# Patient Record
Sex: FEMALE | Race: WHITE
[De-identification: names, ages, dates, MRNs, and addresses within clinical notes are randomized per-mention and may not be internally consistent; named-entity substitution may affect disease eponyms.]

---

## 2020-12-12 ENCOUNTER — HOSPITAL ENCOUNTER (INPATIENT)
Dept: HOSPITAL 95 - BC | Age: 25
LOS: 5 days | Discharge: HOME | End: 2020-12-17
Attending: OBSTETRICS & GYNECOLOGY | Admitting: ADVANCED PRACTICE MIDWIFE
Payer: COMMERCIAL

## 2020-12-12 VITALS — BODY MASS INDEX: 39.3 KG/M2 | WEIGHT: 216.27 LBS | HEIGHT: 62.01 IN

## 2020-12-12 DIAGNOSIS — D62: ICD-10-CM

## 2020-12-12 DIAGNOSIS — N17.0: ICD-10-CM

## 2020-12-12 DIAGNOSIS — O45.013: Primary | ICD-10-CM

## 2020-12-12 DIAGNOSIS — A41.9: ICD-10-CM

## 2020-12-12 DIAGNOSIS — F17.210: ICD-10-CM

## 2020-12-12 DIAGNOSIS — Z37.1: ICD-10-CM

## 2020-12-12 DIAGNOSIS — E87.5: ICD-10-CM

## 2020-12-12 DIAGNOSIS — E87.6: ICD-10-CM

## 2020-12-12 DIAGNOSIS — Z3A.28: ICD-10-CM

## 2020-12-12 DIAGNOSIS — Z20.828: ICD-10-CM

## 2020-12-12 DIAGNOSIS — R65.21: ICD-10-CM

## 2020-12-12 LAB
ALBUMIN SERPL BCP-MCNC: 2.8 G/DL (ref 3.4–5)
ALBUMIN/GLOB SERPL: 0.6 {RATIO} (ref 0.8–1.8)
ALT SERPL W P-5'-P-CCNC: 45 U/L (ref 12–78)
ANION GAP SERPL CALCULATED.4IONS-SCNC: 11 MMOL/L (ref 6–16)
AST SERPL W P-5'-P-CCNC: 46 U/L (ref 12–37)
BASOPHILS # BLD AUTO: 0.14 K/MM3 (ref 0–0.23)
BASOPHILS NFR BLD AUTO: 1 % (ref 0–2)
BILIRUB SERPL-MCNC: 0.9 MG/DL (ref 0.1–1)
BUN SERPL-MCNC: 17 MG/DL (ref 8–24)
CALCIUM SERPL-MCNC: 9.1 MG/DL (ref 8.5–10.1)
CHLORIDE SERPL-SCNC: 108 MMOL/L (ref 98–108)
CO2 SERPL-SCNC: 18 MMOL/L (ref 21–32)
CREAT SERPL-MCNC: 0.92 MG/DL (ref 0.4–1)
D DIMER PPP FEU-MCNC: >35.2 MG/L FEU (ref 0–0.52)
DEPRECATED RDW RBC AUTO: 42.6 FL (ref 35.1–46.3)
EOSINOPHIL # BLD AUTO: 0.16 K/MM3 (ref 0–0.68)
EOSINOPHIL NFR BLD AUTO: 1 % (ref 0–6)
ERYTHROCYTE [DISTWIDTH] IN BLOOD BY AUTOMATED COUNT: 13.9 % (ref 11.7–14.2)
FIBRINOGEN PPP-MCNC: 71 MG/DL (ref 170–430)
GLOBULIN SER CALC-MCNC: 4.5 G/DL (ref 2.2–4)
GLUCOSE SERPL-MCNC: 103 MG/DL (ref 70–99)
HCT VFR BLD AUTO: 35.8 % (ref 33–51)
HGB BLD-MCNC: 11.9 G/DL (ref 11.5–16)
IMM GRANULOCYTES # BLD AUTO: 0.34 K/MM3 (ref 0–0.1)
IMM GRANULOCYTES NFR BLD AUTO: 1 % (ref 0–1)
LYMPHOCYTES # BLD AUTO: 2.79 K/MM3 (ref 0.84–5.2)
LYMPHOCYTES NFR BLD AUTO: 9 % (ref 21–46)
MCHC RBC AUTO-ENTMCNC: 33.2 G/DL (ref 31.5–36.5)
MCV RBC AUTO: 85 FL (ref 80–100)
MONOCYTES # BLD AUTO: 1.3 K/MM3 (ref 0.16–1.47)
MONOCYTES NFR BLD AUTO: 4 % (ref 4–13)
NEUTROPHILS # BLD AUTO: 25.67 K/MM3 (ref 1.96–9.15)
NEUTROPHILS NFR BLD AUTO: 84 % (ref 41–73)
NRBC # BLD AUTO: 0 K/MM3 (ref 0–0.02)
NRBC BLD AUTO-RTO: 0 /100 WBC (ref 0–0.2)
PLATELET # BLD AUTO: 189 K/MM3 (ref 150–400)
POTASSIUM SERPL-SCNC: 3.3 MMOL/L (ref 3.5–5.5)
PROT SERPL-MCNC: 7.3 G/DL (ref 6.4–8.2)
PROTHROMBIN TIME: 14.8 SEC (ref 9.7–11.5)
SODIUM SERPL-SCNC: 137 MMOL/L (ref 136–145)

## 2020-12-12 PROCEDURE — G0480 DRUG TEST DEF 1-7 CLASSES: HCPCS

## 2020-12-12 PROCEDURE — P9012 CRYOPRECIPITATE EACH UNIT: HCPCS

## 2020-12-12 PROCEDURE — P9016 RBC LEUKOCYTES REDUCED: HCPCS

## 2020-12-12 PROCEDURE — A9270 NON-COVERED ITEM OR SERVICE: HCPCS

## 2020-12-12 NOTE — NUR
PT REQUESTING PERCOCET FOR PAIN. SHE IS OFFERED FENTANYL AFTER IV START, BUT
SHE STATES SHE IS TOO PAINFUL TO WAIT. CESAR MOORE CNM UPDATING ORDER FOR
PERCOCET Q 2 HOURS.

## 2020-12-13 LAB
ALBUMIN SERPL BCP-MCNC: 1.9 G/DL (ref 3.4–5)
ALBUMIN SERPL BCP-MCNC: 1.9 G/DL (ref 3.4–5)
ALBUMIN SERPL BCP-MCNC: 2.4 G/DL (ref 3.4–5)
ALBUMIN SERPL BCP-MCNC: 2.6 G/DL (ref 3.4–5)
ALBUMIN/GLOB SERPL: 0.6 {RATIO} (ref 0.8–1.8)
ALBUMIN/GLOB SERPL: 0.7 {RATIO} (ref 0.8–1.8)
ALT SERPL W P-5'-P-CCNC: 27 U/L (ref 12–78)
ALT SERPL W P-5'-P-CCNC: 31 U/L (ref 12–78)
ALT SERPL W P-5'-P-CCNC: 41 U/L (ref 12–78)
ALT SERPL W P-5'-P-CCNC: 44 U/L (ref 12–78)
ANION GAP SERPL CALCULATED.4IONS-SCNC: 6 MMOL/L (ref 6–16)
ANION GAP SERPL CALCULATED.4IONS-SCNC: 7 MMOL/L (ref 6–16)
ANION GAP SERPL CALCULATED.4IONS-SCNC: 7 MMOL/L (ref 6–16)
ANION GAP SERPL CALCULATED.4IONS-SCNC: 9 MMOL/L (ref 6–16)
AST SERPL W P-5'-P-CCNC: 29 U/L (ref 12–37)
AST SERPL W P-5'-P-CCNC: 31 U/L (ref 12–37)
AST SERPL W P-5'-P-CCNC: 37 U/L (ref 12–37)
AST SERPL W P-5'-P-CCNC: 44 U/L (ref 12–37)
BASOPHILS # BLD AUTO: 0.03 K/MM3 (ref 0–0.23)
BASOPHILS # BLD AUTO: 0.05 K/MM3 (ref 0–0.23)
BASOPHILS # BLD AUTO: 0.06 K/MM3 (ref 0–0.23)
BASOPHILS NFR BLD AUTO: 0 % (ref 0–2)
BILIRUB SERPL-MCNC: 0.3 MG/DL (ref 0.1–1)
BILIRUB SERPL-MCNC: 0.4 MG/DL (ref 0.1–1)
BILIRUB SERPL-MCNC: 0.5 MG/DL (ref 0.1–1)
BILIRUB SERPL-MCNC: 0.5 MG/DL (ref 0.1–1)
BUN SERPL-MCNC: 23 MG/DL (ref 8–24)
BUN SERPL-MCNC: 24 MG/DL (ref 8–24)
BUN SERPL-MCNC: 24 MG/DL (ref 8–24)
BUN SERPL-MCNC: 26 MG/DL (ref 8–24)
CALCIUM SERPL-MCNC: 7.2 MG/DL (ref 8.5–10.1)
CALCIUM SERPL-MCNC: 7.7 MG/DL (ref 8.5–10.1)
CALCIUM SERPL-MCNC: 7.7 MG/DL (ref 8.5–10.1)
CALCIUM SERPL-MCNC: 8.1 MG/DL (ref 8.5–10.1)
CHLORIDE SERPL-SCNC: 108 MMOL/L (ref 98–108)
CHLORIDE SERPL-SCNC: 109 MMOL/L (ref 98–108)
CO2 SERPL-SCNC: 18 MMOL/L (ref 21–32)
CO2 SERPL-SCNC: 19 MMOL/L (ref 21–32)
CO2 SERPL-SCNC: 21 MMOL/L (ref 21–32)
CO2 SERPL-SCNC: 21 MMOL/L (ref 21–32)
CREAT SERPL-MCNC: 1.25 MG/DL (ref 0.4–1)
CREAT SERPL-MCNC: 1.3 MG/DL (ref 0.4–1)
CREAT SERPL-MCNC: 1.33 MG/DL (ref 0.4–1)
CREAT SERPL-MCNC: 1.58 MG/DL (ref 0.4–1)
D DIMER PPP FEU-MCNC: 12.98 MG/L FEU (ref 0–0.52)
D DIMER PPP FEU-MCNC: >35.2 MG/L FEU (ref 0–0.52)
D DIMER PPP FEU-MCNC: >35.2 MG/L FEU (ref 0–0.52)
DEPRECATED RDW RBC AUTO: 43.8 FL (ref 35.1–46.3)
DEPRECATED RDW RBC AUTO: 45.1 FL (ref 35.1–46.3)
DEPRECATED RDW RBC AUTO: 49.1 FL (ref 35.1–46.3)
DEPRECATED RDW RBC AUTO: 52 FL (ref 35.1–46.3)
EOSINOPHIL # BLD AUTO: 0 K/MM3 (ref 0–0.68)
EOSINOPHIL NFR BLD AUTO: 0 % (ref 0–6)
ERYTHROCYTE [DISTWIDTH] IN BLOOD BY AUTOMATED COUNT: 14.2 % (ref 11.7–14.2)
ERYTHROCYTE [DISTWIDTH] IN BLOOD BY AUTOMATED COUNT: 14.3 % (ref 11.7–14.2)
ERYTHROCYTE [DISTWIDTH] IN BLOOD BY AUTOMATED COUNT: 15.9 % (ref 11.7–14.2)
ERYTHROCYTE [DISTWIDTH] IN BLOOD BY AUTOMATED COUNT: 17 % (ref 11.7–14.2)
FIBRINOGEN PPP-MCNC: 239 MG/DL (ref 170–430)
FIBRINOGEN PPP-MCNC: 280 MG/DL (ref 170–430)
GLOBULIN SER CALC-MCNC: 2.9 G/DL (ref 2.2–4)
GLOBULIN SER CALC-MCNC: 3.1 G/DL (ref 2.2–4)
GLOBULIN SER CALC-MCNC: 4.2 G/DL (ref 2.2–4)
GLOBULIN SER CALC-MCNC: 4.3 G/DL (ref 2.2–4)
GLUCOSE SERPL-MCNC: 109 MG/DL (ref 70–99)
GLUCOSE SERPL-MCNC: 119 MG/DL (ref 70–99)
GLUCOSE SERPL-MCNC: 150 MG/DL (ref 70–99)
GLUCOSE SERPL-MCNC: 159 MG/DL (ref 70–99)
GLUCOSE UR-MCNC: (no result) MG/DL
HCT VFR BLD AUTO: 18.3 % (ref 33–51)
HCT VFR BLD AUTO: 20.7 % (ref 33–51)
HCT VFR BLD AUTO: 24.2 % (ref 33–51)
HCT VFR BLD AUTO: 29.2 % (ref 33–51)
HGB BLD-MCNC: 6 G/DL (ref 11.5–16)
HGB BLD-MCNC: 7.1 G/DL (ref 11.5–16)
HGB BLD-MCNC: 8.3 G/DL (ref 11.5–16)
HGB BLD-MCNC: 9.7 G/DL (ref 11.5–16)
IMM GRANULOCYTES # BLD AUTO: 0.23 K/MM3 (ref 0–0.1)
IMM GRANULOCYTES # BLD AUTO: 0.24 K/MM3 (ref 0–0.1)
IMM GRANULOCYTES # BLD AUTO: 0.31 K/MM3 (ref 0–0.1)
IMM GRANULOCYTES NFR BLD AUTO: 1 % (ref 0–1)
LYMPHOCYTES # BLD AUTO: 1.39 K/MM3 (ref 0.84–5.2)
LYMPHOCYTES # BLD AUTO: 1.73 K/MM3 (ref 0.84–5.2)
LYMPHOCYTES # BLD AUTO: 1.91 K/MM3 (ref 0.84–5.2)
LYMPHOCYTES NFR BLD AUTO: 5 % (ref 21–46)
LYMPHOCYTES NFR BLD AUTO: 6 % (ref 21–46)
LYMPHOCYTES NFR BLD AUTO: 7 % (ref 21–46)
MAGNESIUM SERPL-MCNC: 1.5 MG/DL (ref 1.6–2.4)
MCHC RBC AUTO-ENTMCNC: 32.8 G/DL (ref 31.5–36.5)
MCHC RBC AUTO-ENTMCNC: 33.2 G/DL (ref 31.5–36.5)
MCHC RBC AUTO-ENTMCNC: 34.3 G/DL (ref 31.5–36.5)
MCHC RBC AUTO-ENTMCNC: 34.3 G/DL (ref 31.5–36.5)
MCV RBC AUTO: 85 FL (ref 80–100)
MCV RBC AUTO: 85 FL (ref 80–100)
MCV RBC AUTO: 87 FL (ref 80–100)
MCV RBC AUTO: 87 FL (ref 80–100)
MONOCYTES # BLD AUTO: 0.54 K/MM3 (ref 0.16–1.47)
MONOCYTES # BLD AUTO: 0.57 K/MM3 (ref 0.16–1.47)
MONOCYTES # BLD AUTO: 1.04 K/MM3 (ref 0.16–1.47)
MONOCYTES NFR BLD AUTO: 2 % (ref 4–13)
MONOCYTES NFR BLD AUTO: 2 % (ref 4–13)
MONOCYTES NFR BLD AUTO: 4 % (ref 4–13)
NEUTROPHILS # BLD AUTO: 24.28 K/MM3 (ref 1.96–9.15)
NEUTROPHILS # BLD AUTO: 25.22 K/MM3 (ref 1.96–9.15)
NEUTROPHILS # BLD AUTO: 28.5 K/MM3 (ref 1.96–9.15)
NEUTROPHILS NFR BLD AUTO: 88 % (ref 41–73)
NEUTROPHILS NFR BLD AUTO: 91 % (ref 41–73)
NEUTROPHILS NFR BLD AUTO: 93 % (ref 41–73)
NRBC # BLD AUTO: 0 K/MM3 (ref 0–0.02)
NRBC # BLD AUTO: 0.02 K/MM3 (ref 0–0.02)
NRBC BLD AUTO-RTO: 0 /100 WBC (ref 0–0.2)
NRBC BLD AUTO-RTO: 0.1 /100 WBC (ref 0–0.2)
OXYCODONE UR-MCNC: DETECTED NG/ML
PLATELET # BLD AUTO: 107 K/MM3 (ref 150–400)
PLATELET # BLD AUTO: 146 K/MM3 (ref 150–400)
PLATELET # BLD AUTO: 95 K/MM3 (ref 150–400)
PLATELET # BLD AUTO: 95 K/MM3 (ref 150–400)
POTASSIUM SERPL-SCNC: 5 MMOL/L (ref 3.5–5.5)
POTASSIUM SERPL-SCNC: 5.6 MMOL/L (ref 3.5–5.5)
POTASSIUM SERPL-SCNC: 6.6 MMOL/L (ref 3.5–5.5)
POTASSIUM SERPL-SCNC: 6.9 MMOL/L (ref 3.5–5.5)
PROT SERPL-MCNC: 4.8 G/DL (ref 6.4–8.2)
PROT SERPL-MCNC: 5 G/DL (ref 6.4–8.2)
PROT SERPL-MCNC: 6.6 G/DL (ref 6.4–8.2)
PROT SERPL-MCNC: 6.9 G/DL (ref 6.4–8.2)
PROT UR STRIP-MCNC: (no result) MG/DL
PROTHROMBIN TIME: 10.2 SEC (ref 9.7–11.5)
PROTHROMBIN TIME: 10.7 SEC (ref 9.7–11.5)
PROTHROMBIN TIME: 11.9 SEC (ref 9.7–11.5)
SODIUM SERPL-SCNC: 134 MMOL/L (ref 136–145)
SODIUM SERPL-SCNC: 135 MMOL/L (ref 136–145)
SODIUM SERPL-SCNC: 136 MMOL/L (ref 136–145)
SODIUM SERPL-SCNC: 136 MMOL/L (ref 136–145)
SP GR SPEC: 1.02 (ref 1–1.02)
UROBILINOGEN UR STRIP-MCNC: (no result) MG/DL
WBC #/AREA URNS HPF: (no result) /HPF (ref 0–5)

## 2020-12-13 PROCEDURE — 30233N1 TRANSFUSION OF NONAUTOLOGOUS RED BLOOD CELLS INTO PERIPHERAL VEIN, PERCUTANEOUS APPROACH: ICD-10-PCS | Performed by: INTERNAL MEDICINE

## 2020-12-13 NOTE — NUR
Patient transferred to room 129, as [facility] status. Report given to
Kari COHN. At time of transfer, pt A&O x 4. Answering questions. Following
commands. Verbalizing needs. Room air. SR per monitor. BP stable. Gamez
catheter patent and draining clear yellow urine. BONI site unchanged from
initial assessment. Pt having small amount of blood per vagina. Stating pain
feels much better controlled. Pt tolerating fundus assessments well. Fundus
remains unchanged since assessment with FBP charge RN this morning. 
Wonderly updated, as pitocin was completed. Provider stated no additional
pitocin necessary. Chart, medications, and belonging transferred with patient.
Spouse, Bhupinder, notified of transfer.

## 2020-12-13 NOTE — NUR
Call placed to Dr Collins to notify of elevated potassium. Orders given for
D50 and IV inuslin. Recheck potassium in two hours.

## 2020-12-13 NOTE — NUR
PT ARRIVED TO ROOM 129 VIA BED FROM ICU. PAS APPLIED AND TURNED ON, IV
INFUSING, PT ORIENTED TO ROOM.

## 2020-12-13 NOTE — NUR
Pt's spouse at bedside. Spouse, Bhupinder, pleasant and cooperative. Pt awake,
tolerating clear liquids well. Remains on room air. 1 unit PRBCs transfusing.
SR per monitor. BP stable. Uterine fundus approx 2 inches below belly button.
Small amount of new drainage to antonino pad. Pt given IV fentanyl and PO percocet
for pain.  Wondervimal in to see patient several times since arrival to unit.

## 2020-12-13 NOTE — NUR
12/13/20 0625 Norma Hayes
PT RECIEVED SCHEDULED ANTIBIOTICS PRIOR TO ARRIVAL TO OR.
BLOOD DRAWN PER DR MARTIN AFTER INDUCTION.
DEMISED BABY BORN AT 0621.
CBG CHECKED PT .

## 2020-12-13 NOTE — NUR
Assumed care of pt on arrival to ICU 14 from OR and previous, [facility] at 0735. Pt admitted to unit for monitoring of sepsis and potential
DIC s/p  with general anesthesia for fetal demise and placental
abruption. Pt alert, oriented x 3. Lethargic, but aware of circumstances.  Pt
arrived with OR nurse, Luisa. Dr Cornell and Dr Lang. Pt asks provider "Was
my baby really dead?". Pt has low abd incision covered with isaías dressing.
Scant amount of drainage on dressing. Megan pad underneath patient with small
amount of dried red drainage. Uterine fundus assessed with charge LALIT Bhat.
At time of assessment, fundus palpated approx 1.5 inches below belly button.
Per report from both doctors, pt received cytotec suppository for bleeding.
Dr Lang states to continue to assess funus to ensure uterus is getting
smaller, but otherwise no massage necessary. Provider also give verbal order
to administer one unit of PRBCs. Stated EBL was 800 mL intraopertive with an
additional 800 mL in clots. Preopertive, pt also had about 800 mL measured on
megan pad.
 
On assessment, pt follows commands, verbalizes needs, and answers questions
appropriately. Pt sad, but pleasant and coopertive with care. Lungs clear,
diminished in bases. RR 14, respirations shallow. SpO2 90% or greater with
room air. ST per monitor. BP stable. Gamez catheter in place with small amount
of yellow urine. Bed in lowest position. Call light in reach. Pt denies need
at this time.

## 2020-12-13 NOTE — NUR
Fundus assessed with Shannan COHN from [facility], [facility]. No blood per
vagina during paplation. Pt rolled for throrough linen assessment. Pt
continues to shed small amounts of blood per vagina. Pt tolerating clear
liquids well. SR per monitor, HR ranging 75-85. Hypertensive at times, but
resolves with pain control.

## 2020-12-13 NOTE — NUR
Call placed to Dr Collins to notify of new lab results. Discussed that WBC
increased, as well as lactic acid. Discussed with provider that these labs
were drawn before vancomycin was started. Provider states vancomycin will be
sufficient to address these abnormal labs. Reviewed PT, INR, PTT, fibrinogen,
and HGB with provider. Provider stated these are acceptable. Discussed
magnesium and potassium results. Provider states to give 1 G mag sulfate and
recheck potassium when magnesium is done. Reviewed that pt has had sepsis
bolus and a lot of fluid when accounting for ABX and blood. Provider states
that in addition to LR at 125 (with pitocin), provider would like patient to
receive 50 mL/hr NS. Altogether, provider would like patient to receive a
total of 175 mL/hr of fluids.

## 2020-12-14 LAB
BASOPHILS # BLD AUTO: 0.03 K/MM3 (ref 0–0.23)
BASOPHILS # BLD AUTO: 0.07 K/MM3 (ref 0–0.23)
BASOPHILS NFR BLD AUTO: 0 % (ref 0–2)
BASOPHILS NFR BLD AUTO: 0 % (ref 0–2)
D DIMER PPP FEU-MCNC: 7.78 MG/L FEU (ref 0–0.52)
DEPRECATED RDW RBC AUTO: 52.3 FL (ref 35.1–46.3)
DEPRECATED RDW RBC AUTO: 55.5 FL (ref 35.1–46.3)
EOSINOPHIL # BLD AUTO: 0.01 K/MM3 (ref 0–0.68)
EOSINOPHIL # BLD AUTO: 0.12 K/MM3 (ref 0–0.68)
EOSINOPHIL NFR BLD AUTO: 0 % (ref 0–6)
EOSINOPHIL NFR BLD AUTO: 1 % (ref 0–6)
ERYTHROCYTE [DISTWIDTH] IN BLOOD BY AUTOMATED COUNT: 16.9 % (ref 11.7–14.2)
ERYTHROCYTE [DISTWIDTH] IN BLOOD BY AUTOMATED COUNT: 17.9 % (ref 11.7–14.2)
FIBRINOGEN PPP-MCNC: 285 MG/DL (ref 170–430)
HCT VFR BLD AUTO: 17.7 % (ref 33–51)
HCT VFR BLD AUTO: 25.8 % (ref 33–51)
HGB BLD-MCNC: 6 G/DL (ref 11.5–16)
HGB BLD-MCNC: 8.7 G/DL (ref 11.5–16)
IMM GRANULOCYTES # BLD AUTO: 0.15 K/MM3 (ref 0–0.1)
IMM GRANULOCYTES # BLD AUTO: 0.3 K/MM3 (ref 0–0.1)
IMM GRANULOCYTES NFR BLD AUTO: 1 % (ref 0–1)
IMM GRANULOCYTES NFR BLD AUTO: 2 % (ref 0–1)
LYMPHOCYTES # BLD AUTO: 2.39 K/MM3 (ref 0.84–5.2)
LYMPHOCYTES # BLD AUTO: 2.58 K/MM3 (ref 0.84–5.2)
LYMPHOCYTES NFR BLD AUTO: 11 % (ref 21–46)
LYMPHOCYTES NFR BLD AUTO: 13 % (ref 21–46)
MCHC RBC AUTO-ENTMCNC: 33.7 G/DL (ref 31.5–36.5)
MCHC RBC AUTO-ENTMCNC: 33.9 G/DL (ref 31.5–36.5)
MCV RBC AUTO: 87 FL (ref 80–100)
MCV RBC AUTO: 87 FL (ref 80–100)
MONOCYTES # BLD AUTO: 1.14 K/MM3 (ref 0.16–1.47)
MONOCYTES # BLD AUTO: 1.37 K/MM3 (ref 0.16–1.47)
MONOCYTES NFR BLD AUTO: 6 % (ref 4–13)
MONOCYTES NFR BLD AUTO: 6 % (ref 4–13)
NEUTROPHILS # BLD AUTO: 15.38 K/MM3 (ref 1.96–9.15)
NEUTROPHILS # BLD AUTO: 17.98 K/MM3 (ref 1.96–9.15)
NEUTROPHILS NFR BLD AUTO: 79 % (ref 41–73)
NEUTROPHILS NFR BLD AUTO: 82 % (ref 41–73)
NRBC # BLD AUTO: 0.02 K/MM3 (ref 0–0.02)
NRBC # BLD AUTO: 0.02 K/MM3 (ref 0–0.02)
NRBC BLD AUTO-RTO: 0.1 /100 WBC (ref 0–0.2)
NRBC BLD AUTO-RTO: 0.1 /100 WBC (ref 0–0.2)
PLATELET # BLD AUTO: 101 K/MM3 (ref 150–400)
PLATELET # BLD AUTO: 88 K/MM3 (ref 150–400)
PROTHROMBIN TIME: 10.2 SEC (ref 9.7–11.5)
VANCOMYCIN TROUGH SERPL-MCNC: 40.2 UG/ML (ref 5–10)

## 2020-12-14 NOTE — NUR
DR. LIMA AT BEDSIDE. IVF RATE DECREASED PER  OK TO GET PT OOB TO CHAIR
AND SHOWER IF PT TOLERATES. PT DESIRES TO GET OOB TODAY. PLAN WHEN BLOOD
INFUSION COMPLETE TO TRY GETTING OOB. DISCUSSED TAKING THINGS SLOW TODAY BUT
THAT THE GOALS ARE TO INCREASE I.S. USE, OOB TO SHOWER, AND UP TO CHAIR TODAY.
PT TALKATIVE AND AGREES WITH PLANS. DISCUSSED  HOME ARRANGEMENTS WITH
PT AND PT STATES SHE IS READY TO WORK ON PAPERWORK. PAPERWORK PROVIDED.

## 2020-12-14 NOTE — NUR
WONDERLY UPDATED ON PT CRITCAL LAB VALUES OF HCT 17.7 AND HGB OF 6.0. ORDERS
TO TRANSFUSE TWO UNITS OF PACKED RED BLOOD CELLS, AND REBEAT H AND H LABS 6
HOURS AFTER LAST UNIT

## 2020-12-14 NOTE — NUR
PT OOB TO SHOWER, VINNY WELL. R/T CHAIR PER PT REQUEST. SITTING UP AND HOLDING
BABY. MAJOR LEFT IN AT THIS TIME TO BE SURE PT CAN VINNY GETTING UP TO BRP
WITHOUT DIFFICULTIES. VINNY PO LIQUIDS WELL.

## 2020-12-14 NOTE — NUR
PT C/O FEELING TIRED. HAS TRIED TO NAP ALL DAY BUT HAS BEEN STIRRED EACH TIME.
VS DONE. VS WNL. RN OUT OF ROOM AND WILL LOOK IN ON HER IN AN HOUR BUT NO NEED
TO WAKE HER. PT TO CALL IF SHE NEEDS ANY THING.

## 2020-12-14 NOTE — NUR
PT REPORTS NOT WANTING TO TAKE A DEEP BREATH R/T FEELING LIKE SHE NEEDS TO
COUGH AND IT HURTS. PT ASKED TO DEMONSTRATE USE OF I.S. ABLE TO GET UP TO 1000
ML. ENCOURAGED TO MAKE GOALS TO GET UP TO 1500ML TODAY. ENCOUAGED TO USE Q 1H
WHILE AWAKE. PT DEMONSTRATES UNDERSTANDING

## 2020-12-15 LAB
ALBUMIN SERPL BCP-MCNC: 1.7 G/DL (ref 3.4–5)
ALBUMIN/GLOB SERPL: 0.5 {RATIO} (ref 0.8–1.8)
ALT SERPL W P-5'-P-CCNC: 23 U/L (ref 12–78)
ANION GAP SERPL CALCULATED.4IONS-SCNC: 7 MMOL/L (ref 6–16)
AST SERPL W P-5'-P-CCNC: 23 U/L (ref 12–37)
BASOPHILS # BLD AUTO: 0.11 K/MM3 (ref 0–0.23)
BASOPHILS NFR BLD AUTO: 1 % (ref 0–2)
BILIRUB SERPL-MCNC: 0.3 MG/DL (ref 0.1–1)
BUN SERPL-MCNC: 23 MG/DL (ref 8–24)
CALCIUM SERPL-MCNC: 7.8 MG/DL (ref 8.5–10.1)
CHLORIDE SERPL-SCNC: 113 MMOL/L (ref 98–108)
CO2 SERPL-SCNC: 21 MMOL/L (ref 21–32)
CREAT SERPL-MCNC: 2.11 MG/DL (ref 0.4–1)
DEPRECATED RDW RBC AUTO: 54 FL (ref 35.1–46.3)
EOSINOPHIL # BLD AUTO: 0.26 K/MM3 (ref 0–0.68)
EOSINOPHIL NFR BLD AUTO: 1 % (ref 0–6)
ERYTHROCYTE [DISTWIDTH] IN BLOOD BY AUTOMATED COUNT: 17.3 % (ref 11.7–14.2)
GLOBULIN SER CALC-MCNC: 3.2 G/DL (ref 2.2–4)
GLUCOSE SERPL-MCNC: 75 MG/DL (ref 70–99)
HCT VFR BLD AUTO: 24.3 % (ref 33–51)
HGB BLD-MCNC: 8.2 G/DL (ref 11.5–16)
IMM GRANULOCYTES # BLD AUTO: 0.22 K/MM3 (ref 0–0.1)
IMM GRANULOCYTES NFR BLD AUTO: 1 % (ref 0–1)
LYMPHOCYTES # BLD AUTO: 2.49 K/MM3 (ref 0.84–5.2)
LYMPHOCYTES NFR BLD AUTO: 14 % (ref 21–46)
MCHC RBC AUTO-ENTMCNC: 33.7 G/DL (ref 31.5–36.5)
MCV RBC AUTO: 88 FL (ref 80–100)
MONOCYTES # BLD AUTO: 0.99 K/MM3 (ref 0.16–1.47)
MONOCYTES NFR BLD AUTO: 5 % (ref 4–13)
NEUTROPHILS # BLD AUTO: 14.25 K/MM3 (ref 1.96–9.15)
NEUTROPHILS NFR BLD AUTO: 78 % (ref 41–73)
NRBC # BLD AUTO: 0.02 K/MM3 (ref 0–0.02)
NRBC BLD AUTO-RTO: 0.1 /100 WBC (ref 0–0.2)
PLATELET # BLD AUTO: 111 K/MM3 (ref 150–400)
POTASSIUM SERPL-SCNC: 4.3 MMOL/L (ref 3.5–5.5)
PROT SERPL-MCNC: 4.9 G/DL (ref 6.4–8.2)
SODIUM SERPL-SCNC: 141 MMOL/L (ref 136–145)
VANCOMYCIN SERPL-MCNC: 21.9 UG/ML

## 2020-12-15 NOTE — NUR
PT'S HEART RATE CONTINUES TO SPIKE -130 WITH ACTIVITY SUCH AS WALKING TO
THE BATHROOM AND WITH EMOTIONAL DISTRESS.

## 2020-12-15 NOTE — NUR
PATIENT SITTING UP IN CHAIR BACK IN BED FOR VS B/P 169/95 ASYMPTOMATIC, WAS
UPSET ON PHONE WITH SO, BIOX 94-97% NOT SOB WILL CONTINUE TO MONITOR VOIDED
200CC

## 2020-12-15 NOTE — NUR
RN ROUNDED TO TALK W/ PATIENT ABOUT WAYS TO REDUCE/RID OF MILK SUPPLY. PT
VERBALIZED UNDERSTANDING, DENIES QUESTIONS.

## 2020-12-15 NOTE — NUR
PATIENT RESTING HER SO IS SWEATING AND APPEARS MANIC AND STATES HE TOOK SOME
MEDICATION FOR IT HE APPEARS TO HAVE HER UPSET WITH HIS BEHAVIOR, PATIENT
DENIES HEADACHE OR EPIGASTRIC PAIN. CONTINUE TO MONITOR

## 2020-12-15 NOTE — NUR
gown changed, milk in using support bra and sent for cabbage to help with
decreasing milk supply,  wonderly assessed patient

## 2020-12-16 LAB
ANION GAP SERPL CALCULATED.4IONS-SCNC: 5 MMOL/L (ref 6–16)
BUN SERPL-MCNC: 20 MG/DL (ref 8–24)
CALCIUM SERPL-MCNC: 8.5 MG/DL (ref 8.5–10.1)
CHLORIDE SERPL-SCNC: 113 MMOL/L (ref 98–108)
CO2 SERPL-SCNC: 23 MMOL/L (ref 21–32)
CREAT SERPL-MCNC: 1.93 MG/DL (ref 0.4–1)
DEPRECATED RDW RBC AUTO: 54.3 FL (ref 35.1–46.3)
ERYTHROCYTE [DISTWIDTH] IN BLOOD BY AUTOMATED COUNT: 17.1 % (ref 11.7–14.2)
GLUCOSE SERPL-MCNC: 79 MG/DL (ref 70–99)
HCT VFR BLD AUTO: 26.6 % (ref 33–51)
HGB BLD-MCNC: 8.9 G/DL (ref 11.5–16)
MCHC RBC AUTO-ENTMCNC: 33.5 G/DL (ref 31.5–36.5)
MCV RBC AUTO: 89 FL (ref 80–100)
NRBC # BLD AUTO: 0.02 K/MM3 (ref 0–0.02)
NRBC BLD AUTO-RTO: 0.1 /100 WBC (ref 0–0.2)
PLATELET # BLD AUTO: 168 K/MM3 (ref 150–400)
POTASSIUM SERPL-SCNC: 4.3 MMOL/L (ref 3.5–5.5)
SODIUM SERPL-SCNC: 141 MMOL/L (ref 136–145)

## 2020-12-16 NOTE — NUR
WONDERLY AWARE OF INCREASED B/P HE WILL CONSULT WITH DR DEL ANGEL CAN CONTINUE
Q 4 HOUR VS AT THIS TIME

## 2020-12-16 NOTE — NUR
B/P ELEVATED PATIENT SITTING HAD JUST WALKED AROUND ROOM, WAS HOLDING BABY
EMOTIONALLY UPSET, PAIN DOWN TO 3, LUNGS STILL DIMINISHED BUT DENIES SOB
CONTINUE TO MONITOR

## 2020-12-17 LAB
ALBUMIN SERPL BCP-MCNC: 1.9 G/DL (ref 3.4–5)
ANION GAP SERPL CALCULATED.4IONS-SCNC: 8 MMOL/L (ref 6–16)
ANION GAP SERPL CALCULATED.4IONS-SCNC: 8 MMOL/L (ref 6–16)
BUN SERPL-MCNC: 20 MG/DL (ref 8–24)
BUN SERPL-MCNC: 21 MG/DL (ref 8–24)
CALCIUM SERPL-MCNC: 8.5 MG/DL (ref 8.5–10.1)
CALCIUM SERPL-MCNC: 8.7 MG/DL (ref 8.5–10.1)
CHLORIDE SERPL-SCNC: 110 MMOL/L (ref 98–108)
CHLORIDE SERPL-SCNC: 111 MMOL/L (ref 98–108)
CO2 SERPL-SCNC: 22 MMOL/L (ref 21–32)
CO2 SERPL-SCNC: 24 MMOL/L (ref 21–32)
CREAT SERPL-MCNC: 1.9 MG/DL (ref 0.4–1)
CREAT SERPL-MCNC: 1.91 MG/DL (ref 0.4–1)
DEPRECATED RDW RBC AUTO: 54 FL (ref 35.1–46.3)
ERYTHROCYTE [DISTWIDTH] IN BLOOD BY AUTOMATED COUNT: 16.6 % (ref 11.7–14.2)
GLUCOSE SERPL-MCNC: 93 MG/DL (ref 70–99)
GLUCOSE SERPL-MCNC: 95 MG/DL (ref 70–99)
HCT VFR BLD AUTO: 26.6 % (ref 33–51)
HGB BLD-MCNC: 8.8 G/DL (ref 11.5–16)
MCHC RBC AUTO-ENTMCNC: 33.1 G/DL (ref 31.5–36.5)
MCV RBC AUTO: 88 FL (ref 80–100)
NRBC # BLD AUTO: 0 K/MM3 (ref 0–0.02)
NRBC BLD AUTO-RTO: 0 /100 WBC (ref 0–0.2)
PHOSPHATE SERPL-MCNC: 6.4 MG/DL (ref 2.5–4.9)
PLATELET # BLD AUTO: 205 K/MM3 (ref 150–400)
POTASSIUM SERPL-SCNC: 3.7 MMOL/L (ref 3.5–5.5)
POTASSIUM SERPL-SCNC: 3.8 MMOL/L (ref 3.5–5.5)
PROTHROMBIN TIME: 9.5 SEC (ref 9.7–11.5)
SODIUM SERPL-SCNC: 141 MMOL/L (ref 136–145)
SODIUM SERPL-SCNC: 142 MMOL/L (ref 136–145)
SP GR SPEC: 1 (ref 1–1.02)
UROBILINOGEN UR STRIP-MCNC: (no result) MG/DL

## 2020-12-17 NOTE — NUR
DR DEL ANGEL HERE TO SEE PATIENT UP VOIDING NO NEED FOR PAS STOCKINGS AT THIS TIME
PER HER AS LONG AS P[ATIENT IS UP MOVING

## 2020-12-17 NOTE — NUR
LABS DRAWN, UP TO VOID FEELING BETTER, PATIENT AWARE THAT RISHABH WILL BE HERE
AT 1600, VERY SAD BUT APPEARS TO BE GRIEVING APPROPRIATE

## 2020-12-17 NOTE — NUR
up frequently to bathroom states she feels better states she thought she had a
panaic attack earlier this morning, vss UA collected

## 2020-12-17 NOTE — NUR
RISHABH CALLED TO GET BABY. MOM STATED THIS MORNING SHE WAS READY FOR HIM TO
GO THEY WILL BE HERE APPROX 1600. MOM APPEARS TO BE SLEEPING FOR PAST COUPLE
HOURS.

## 2020-12-17 NOTE — NUR
b/p elevated patient just finished U/S. changed out b/p cuff new
nympepl987/80, states feels better, resting now

## 2020-12-17 NOTE — NUR
0348- PT CALLED NURSE INTO ROOM BECAUSE SHE WOKE UP AND SHE WAS REALLY SHAKY
AND COLD. RN CHECKED PTS VS, BP WAS ELEVATED, PT ASKED IF SHE COULD USE THE
RESTROOM BEFORE RECHECKING VS. ONCE PT WAS IN THE RESTROON RN CALLED CHARGE
NURSE MS INTO ROOM TO ASSESS PT. WHILE PT WAS STANDING AT THE BATHROOM SINK
WASHING HER HANDS SHE STARTED COUGHING AND HER LEGS WERE STARTING TO BEND, RNS
ASSISTED PT TO SIT ON THE TOILET UNTIL SHE WAS ABLE TO CATCH HER BREATH AND
WALK BACK TO THE BED. ONCE IN BED PT HAD ANOTHER COUGHING FIT, VS CHECKED AND
PTS PULSE  WITH SPO2 IN THE MID 80s. O2 10L VIA NONREABREATHER
ADMINISTERED AND THE ON CALL HOSPITALIST DR HARDING CALLED. ORDER FOR IV
LABETOLOL. PTS O2 SATURATIONS IMPROVED QUICKLY ON OXYGEN AND HER HEARTRATE
CAME DOWN BACK IN TO THE 90S-100S WITHIN APPROX 5 MINUTES. OXYGEN DISCONTINUED
ONCE PTS SPO2 AND HR NORMALIZED. MS CHARGE RN PLACED 18G IV IN EXPECTATION OF
STARTING IV LABETOLOL, ONCE IV PLACED PTS BP LOWERED TO THE 150S/80S, RN
CONFIRMED WITH DR HARDING AND IV LABETOLOL TO BE HELD. DR HARDING TO COME
ASSESS PT SHORTLY.

## 2020-12-18 LAB
ANTI-DSDNA ANTIBODIES: <1 IU/ML (ref 0–9)
APTT BLD: 29.2 SEC
C3 SERPL-MCNC: 167 MG/DL (ref 82–167)
C4 SERPL-MCNC: 33 MG/DL (ref 12–38)
ENA RNP AB SER-ACNC: 0.3 AI (ref 0–0.9)
ENA SM AB SER-ACNC: <0.2 AI (ref 0–0.9)
ENA SS-A AB SER-ACNC: <0.2 AI (ref 0–0.9)
ENA SS-B AB SER-ACNC: <0.2 AI (ref 0–0.9)
INR: 1.5 RATIO
Lab: 12.1 SEC
Lab: 26.6 SEC
Lab: 42.3 SEC
Lab: 46.7 SEC
Lab: <10 UNITS
PROTHROM IGG SERPL-ACNC: <10 UNITS
PROTHROMBIN TIME: 15 SEC
PT 1:1 NP 60 INC: 11.8 SEC
PT 1:1 NP: 10.3 SEC
THROMBIN TIME: 39.4 SEC

## 2020-12-21 ENCOUNTER — HOSPITAL ENCOUNTER (EMERGENCY)
Dept: HOSPITAL 95 - ER | Age: 25
Discharge: LEFT BEFORE DISCHARGE | End: 2020-12-21
Payer: COMMERCIAL

## 2020-12-21 ENCOUNTER — HOSPITAL ENCOUNTER (INPATIENT)
Dept: HOSPITAL 95 - SURS | Age: 25
LOS: 3 days | Discharge: HOME | DRG: 776 | End: 2020-12-24
Attending: OBSTETRICS & GYNECOLOGY | Admitting: OBSTETRICS & GYNECOLOGY
Payer: COMMERCIAL

## 2020-12-21 VITALS — HEIGHT: 62.01 IN | WEIGHT: 165.35 LBS | BODY MASS INDEX: 30.04 KG/M2

## 2020-12-21 DIAGNOSIS — I34.0: ICD-10-CM

## 2020-12-21 DIAGNOSIS — I82.622: ICD-10-CM

## 2020-12-21 DIAGNOSIS — D64.9: ICD-10-CM

## 2020-12-21 DIAGNOSIS — Z20.828: ICD-10-CM

## 2020-12-21 DIAGNOSIS — F17.210: ICD-10-CM

## 2020-12-21 DIAGNOSIS — I50.21: ICD-10-CM

## 2020-12-21 DIAGNOSIS — N18.9: ICD-10-CM

## 2020-12-21 DIAGNOSIS — Z79.899: ICD-10-CM

## 2020-12-21 DIAGNOSIS — F15.11: ICD-10-CM

## 2020-12-21 DIAGNOSIS — I27.20: ICD-10-CM

## 2020-12-21 DIAGNOSIS — I13.0: ICD-10-CM

## 2020-12-21 DIAGNOSIS — Z53.21: Primary | ICD-10-CM

## 2020-12-21 LAB
ALBUMIN SERPL BCP-MCNC: 2.4 G/DL (ref 3.4–5)
ALBUMIN/GLOB SERPL: 0.5 {RATIO} (ref 0.8–1.8)
ALT SERPL W P-5'-P-CCNC: 45 U/L (ref 12–78)
ANION GAP SERPL CALCULATED.4IONS-SCNC: 9 MMOL/L (ref 6–16)
AST SERPL W P-5'-P-CCNC: 25 U/L (ref 12–37)
BASOPHILS # BLD AUTO: 0.12 K/MM3 (ref 0–0.23)
BASOPHILS NFR BLD AUTO: 1 % (ref 0–2)
BILIRUB SERPL-MCNC: 0.4 MG/DL (ref 0.1–1)
BUN SERPL-MCNC: 12 MG/DL (ref 8–24)
CALCIUM SERPL-MCNC: 9.2 MG/DL (ref 8.5–10.1)
CHLORIDE SERPL-SCNC: 112 MMOL/L (ref 98–108)
CO2 SERPL-SCNC: 20 MMOL/L (ref 21–32)
CREAT SERPL-MCNC: 1.65 MG/DL (ref 0.4–1)
DEPRECATED RDW RBC AUTO: 49.3 FL (ref 35.1–46.3)
EOSINOPHIL # BLD AUTO: 0.33 K/MM3 (ref 0–0.68)
EOSINOPHIL NFR BLD AUTO: 2 % (ref 0–6)
ERYTHROCYTE [DISTWIDTH] IN BLOOD BY AUTOMATED COUNT: 15.5 % (ref 11.7–14.2)
GLOBULIN SER CALC-MCNC: 4.7 G/DL (ref 2.2–4)
GLUCOSE SERPL-MCNC: 90 MG/DL (ref 70–99)
HCT VFR BLD AUTO: 29.2 % (ref 33–51)
HGB BLD-MCNC: 9.6 G/DL (ref 11.5–16)
IMM GRANULOCYTES # BLD AUTO: 0.12 K/MM3 (ref 0–0.1)
IMM GRANULOCYTES NFR BLD AUTO: 1 % (ref 0–1)
KETONES UR STRIP-MCNC: (no result) MG/DL
LYMPHOCYTES # BLD AUTO: 1.88 K/MM3 (ref 0.84–5.2)
LYMPHOCYTES NFR BLD AUTO: 13 % (ref 21–46)
MCHC RBC AUTO-ENTMCNC: 32.9 G/DL (ref 31.5–36.5)
MCV RBC AUTO: 88 FL (ref 80–100)
MONOCYTES # BLD AUTO: 0.82 K/MM3 (ref 0.16–1.47)
MONOCYTES NFR BLD AUTO: 5 % (ref 4–13)
NEUTROPHILS # BLD AUTO: 11.81 K/MM3 (ref 1.96–9.15)
NEUTROPHILS NFR BLD AUTO: 78 % (ref 41–73)
NRBC # BLD AUTO: 0 K/MM3 (ref 0–0.02)
NRBC BLD AUTO-RTO: 0 /100 WBC (ref 0–0.2)
PLATELET # BLD AUTO: 562 K/MM3 (ref 150–400)
POTASSIUM SERPL-SCNC: 4.2 MMOL/L (ref 3.5–5.5)
PROT SERPL-MCNC: 7.1 G/DL (ref 6.4–8.2)
RBC #/AREA URNS HPF: (no result) /HPF (ref 0–2)
SODIUM SERPL-SCNC: 141 MMOL/L (ref 136–145)
SP GR SPEC: 1 (ref 1–1.02)
UROBILINOGEN UR STRIP-MCNC: (no result) MG/DL
WBC #/AREA URNS HPF: (no result) /HPF (ref 0–5)

## 2020-12-21 PROCEDURE — G0378 HOSPITAL OBSERVATION PER HR: HCPCS

## 2020-12-21 PROCEDURE — A9270 NON-COVERED ITEM OR SERVICE: HCPCS

## 2020-12-21 NOTE — NUR
SUMMARY
PT REPORTS FEELING BETTER, /102, HR 99 THIS EVENING, DENIES ANY SOB,
VOIDING WITHOUT DIFFICULTY, RATES PAIN AT 2/10, PT TOOK A SHOWER EARLIER
TODAY, NO OTHER CHANGES THIS SHIFT.

## 2020-12-21 NOTE — NUR
ARRIVED TO ROOM VIA W/C, A&OX3, S.O. AT BEDSIDE SEEMS VERY ANXIOUS, DRAlexa
WONDERLY AWARE OF PT'S HIGH BP AND HR, STATE HE WILL BE IN TO SEE PT, PT
DENIES ANY CHEST PAIN OR PALPITATIONS, REPORTS HAVING SLIGHT SOB, DENIES ANY
COUGH, C/O SORENESS ON L UPPER ARM STATES FROM A BP CUFF FROM PREVIOUS
ADMISSION, REPORTS HAVING SCANT VAGINAL BLEEDING, DENIES ANY FEVERS AT HOME,
CONT. TO MONITOR FOR ANY CHANGES.

## 2020-12-22 LAB
ALBUMIN SERPL BCP-MCNC: 2.5 G/DL (ref 3.4–5)
ANION GAP SERPL CALCULATED.4IONS-SCNC: 7 MMOL/L (ref 6–16)
BASOPHILS # BLD AUTO: 0.14 K/MM3 (ref 0–0.23)
BASOPHILS NFR BLD AUTO: 1 % (ref 0–2)
BUN SERPL-MCNC: 14 MG/DL (ref 8–24)
CALCIUM SERPL-MCNC: 9.3 MG/DL (ref 8.5–10.1)
CHLORIDE SERPL-SCNC: 109 MMOL/L (ref 98–108)
CO2 SERPL-SCNC: 23 MMOL/L (ref 21–32)
CREAT SERPL-MCNC: 1.7 MG/DL (ref 0.4–1)
DEPRECATED RDW RBC AUTO: 49.8 FL (ref 35.1–46.3)
EOSINOPHIL # BLD AUTO: 0.46 K/MM3 (ref 0–0.68)
EOSINOPHIL NFR BLD AUTO: 3 % (ref 0–6)
ERYTHROCYTE [DISTWIDTH] IN BLOOD BY AUTOMATED COUNT: 15.4 % (ref 11.7–14.2)
GLUCOSE SERPL-MCNC: 103 MG/DL (ref 70–99)
HCT VFR BLD AUTO: 28.4 % (ref 33–51)
HGB BLD-MCNC: 9.1 G/DL (ref 11.5–16)
IMM GRANULOCYTES # BLD AUTO: 0.11 K/MM3 (ref 0–0.1)
IMM GRANULOCYTES NFR BLD AUTO: 1 % (ref 0–1)
LYMPHOCYTES # BLD AUTO: 2.39 K/MM3 (ref 0.84–5.2)
LYMPHOCYTES NFR BLD AUTO: 16 % (ref 21–46)
MAGNESIUM SERPL-MCNC: 1.8 MG/DL (ref 1.6–2.4)
MCHC RBC AUTO-ENTMCNC: 32 G/DL (ref 31.5–36.5)
MCV RBC AUTO: 89 FL (ref 80–100)
MONOCYTES # BLD AUTO: 0.96 K/MM3 (ref 0.16–1.47)
MONOCYTES NFR BLD AUTO: 6 % (ref 4–13)
NEUTROPHILS # BLD AUTO: 10.87 K/MM3 (ref 1.96–9.15)
NEUTROPHILS NFR BLD AUTO: 73 % (ref 41–73)
NRBC # BLD AUTO: 0 K/MM3 (ref 0–0.02)
NRBC BLD AUTO-RTO: 0 /100 WBC (ref 0–0.2)
PHOSPHATE SERPL-MCNC: 4.8 MG/DL (ref 2.5–4.9)
PLATELET # BLD AUTO: 563 K/MM3 (ref 150–400)
POTASSIUM SERPL-SCNC: 3.8 MMOL/L (ref 3.5–5.5)
PROTHROMBIN TIME: 12.3 SEC (ref 9.7–11.5)
SODIUM SERPL-SCNC: 139 MMOL/L (ref 136–145)

## 2020-12-22 NOTE — NUR
SHIFT SUMMARY:
 
PT REPORTS FEELING A LOT BETTER IN BEGINNING OF SHIFT. SECOND HALF OF SHIFT PT
DEVELOPED A TEMP .3, CAUSING BODY ACHES AND CHILLS. PT MEDICATED PER
EMAR. TEMP NOW 98.2. HR RANGING FROM 100-115. LAST /94. PT HAVING A
DIFFICULT TIME FALLING ASLEEP LAST NIGHT DESPITE NEW ORDER OF MELATONIN. PT
INDEPENDENT IN ROOM. VINNY PO AND VOIDING ADEQUATE AMOUNT. PT DOES REPORT A
BURNING SENSATION AFTER EACH VOID. PT CALM AND COOPERATIVE WITH CARE. APPEARS
TO BE GREIVING APPROPRIATLY.

## 2020-12-22 NOTE — NUR
SUMMARY
PT WAS FEELING TIRED MOST OF THE MORNING,HAD A LONG NAP THIS AFTERNOON,
REPORTS FEELING BETTER, /84, , DENIES ANY PAIN, BLE'S W/ TRACE
EDEMA, DENIES ANY SOB, AMBULATED OUTSIDE WITH SIG. OTHER, TOLERATED WELL, NO
ACUTE CHANGES THIS SHIFT.

## 2020-12-23 LAB
ALBUMIN SERPL BCP-MCNC: 2.5 G/DL (ref 3.4–5)
ANION GAP SERPL CALCULATED.4IONS-SCNC: 7 MMOL/L (ref 6–16)
BASOPHILS # BLD AUTO: 0.16 K/MM3 (ref 0–0.23)
BASOPHILS NFR BLD AUTO: 1 % (ref 0–2)
BUN SERPL-MCNC: 13 MG/DL (ref 8–24)
CALCIUM SERPL-MCNC: 8.8 MG/DL (ref 8.5–10.1)
CHLORIDE SERPL-SCNC: 107 MMOL/L (ref 98–108)
CO2 SERPL-SCNC: 27 MMOL/L (ref 21–32)
CREAT SERPL-MCNC: 1.88 MG/DL (ref 0.4–1)
DEPRECATED RDW RBC AUTO: 49.1 FL (ref 35.1–46.3)
EOSINOPHIL # BLD AUTO: 0.55 K/MM3 (ref 0–0.68)
EOSINOPHIL NFR BLD AUTO: 4 % (ref 0–6)
ERYTHROCYTE [DISTWIDTH] IN BLOOD BY AUTOMATED COUNT: 15 % (ref 11.7–14.2)
GLUCOSE SERPL-MCNC: 97 MG/DL (ref 70–99)
HCT VFR BLD AUTO: 28.7 % (ref 33–51)
HGB BLD-MCNC: 9.1 G/DL (ref 11.5–16)
IMM GRANULOCYTES # BLD AUTO: 0.1 K/MM3 (ref 0–0.1)
IMM GRANULOCYTES NFR BLD AUTO: 1 % (ref 0–1)
LYMPHOCYTES # BLD AUTO: 2.3 K/MM3 (ref 0.84–5.2)
LYMPHOCYTES NFR BLD AUTO: 16 % (ref 21–46)
MCHC RBC AUTO-ENTMCNC: 31.7 G/DL (ref 31.5–36.5)
MCV RBC AUTO: 89 FL (ref 80–100)
MONOCYTES # BLD AUTO: 1.2 K/MM3 (ref 0.16–1.47)
MONOCYTES NFR BLD AUTO: 8 % (ref 4–13)
NEUTROPHILS # BLD AUTO: 9.93 K/MM3 (ref 1.96–9.15)
NEUTROPHILS NFR BLD AUTO: 70 % (ref 41–73)
NRBC # BLD AUTO: 0 K/MM3 (ref 0–0.02)
NRBC BLD AUTO-RTO: 0 /100 WBC (ref 0–0.2)
PHOSPHATE SERPL-MCNC: 4.6 MG/DL (ref 2.5–4.9)
PLATELET # BLD AUTO: 571 K/MM3 (ref 150–400)
POTASSIUM SERPL-SCNC: 3.6 MMOL/L (ref 3.5–5.5)
SODIUM SERPL-SCNC: 141 MMOL/L (ref 136–145)

## 2020-12-23 NOTE — NUR
SHIFT SUMMARY
PT HAS DONE WELL TODAY. AMBULATES EASILY IN HALLWAYS. EATING, DRINKING, AND
VOIDING WELL. HOPEFUL FOR DC TOMORROW.

## 2020-12-23 NOTE — NUR
SHIFT SUMMARY:
 
NO SIGNIFICANT CHANGES OVER NIGHT. PT TIRED AND SLEEPING MOST OF SHIFT. DENIES
HEADACHE. MAX TEMP 99.1. LAST /97. INDEPENDENT IN ROOM. VOIDING
WELL-URINE CLEAR YELLOW. PT VINNY PO AND DRINKING ADEQUATE AMOUNT OF FLUIDS.
DENIES PAIN IN LEFT ARM.

## 2020-12-24 LAB
ALBUMIN SERPL BCP-MCNC: 2.6 G/DL (ref 3.4–5)
ANION GAP SERPL CALCULATED.4IONS-SCNC: 8 MMOL/L (ref 6–16)
BUN SERPL-MCNC: 22 MG/DL (ref 8–24)
CALCIUM SERPL-MCNC: 9 MG/DL (ref 8.5–10.1)
CHLORIDE SERPL-SCNC: 108 MMOL/L (ref 98–108)
CO2 SERPL-SCNC: 24 MMOL/L (ref 21–32)
CREAT SERPL-MCNC: 1.7 MG/DL (ref 0.4–1)
GLUCOSE SERPL-MCNC: 100 MG/DL (ref 70–99)
PHOSPHATE SERPL-MCNC: 3.9 MG/DL (ref 2.5–4.9)
POTASSIUM SERPL-SCNC: 3.9 MMOL/L (ref 3.5–5.5)
SODIUM SERPL-SCNC: 140 MMOL/L (ref 136–145)

## 2020-12-24 NOTE — NUR
SHIFT SUMMARY
PT RESTING WELL T/O NIGHT. AAOX4. PT DENIES DISCOMFORT/NAUSEA. INDEPENDENT IN
ROOM. GOOD PO INTAKE + OUTPUT. AWAITING AM LABS. NO ACUTE CHANGES OVER NIGHT.
PT REQUESTING TO BE DISCHARGED AS EARLY AS POSSIBLE. PT CURRENTLY RESTING IN
BED WITH CALL LIGHT IN REACH.

## 2020-12-24 NOTE — NUR
DISCHARGE SUMMARY
PT A&OX4, VSS, LEFT FLOOR WITH SPOUSE, WITH ALL PERSONAL POSSESSIONS INCLUDING
DC PACKET AND ORIGINAL SCRIPTS INCLUDING [employer] PACKET TO GET PT STARTED
UNTIL INSURANCE IS APPROVED; SCRIPTS FAXED TO [place] PER PT
REQUEST. PT EDU SHE MUST GET SCRIPTS FILLED TODAY AS TOMORROW IS A HOLIDAY. DC
INSTRUCTIONS PROVIDED. PT REP UNDERSTANDING THOSE INSTRUCTIONS INCLUDING
WHEN/HOW TO TAKE BP, WHEN/HOW TO TAKE BP MEDS WITH INSTRUCTIONS.

## 2021-08-05 ENCOUNTER — HOSPITAL ENCOUNTER (EMERGENCY)
Dept: HOSPITAL 95 - ER | Age: 26
Discharge: HOME | End: 2021-08-05
Payer: COMMERCIAL

## 2021-08-05 VITALS — HEIGHT: 62 IN | WEIGHT: 153.99 LBS | BODY MASS INDEX: 28.34 KG/M2

## 2021-08-05 DIAGNOSIS — Z3A.14: ICD-10-CM

## 2021-08-05 DIAGNOSIS — O99.612: Primary | ICD-10-CM

## 2021-08-05 DIAGNOSIS — Z79.01: ICD-10-CM

## 2021-08-05 DIAGNOSIS — K04.7: ICD-10-CM

## 2021-08-05 DIAGNOSIS — Z79.899: ICD-10-CM

## 2022-01-18 ENCOUNTER — HOSPITAL ENCOUNTER (INPATIENT)
Dept: HOSPITAL 95 - BC | Age: 27
LOS: 2 days | Discharge: HOME | End: 2022-01-20
Attending: OBSTETRICS & GYNECOLOGY | Admitting: OBSTETRICS & GYNECOLOGY
Payer: COMMERCIAL

## 2022-01-18 VITALS — BODY MASS INDEX: 35.3 KG/M2 | WEIGHT: 191.8 LBS | HEIGHT: 62 IN

## 2022-01-18 DIAGNOSIS — F15.20: ICD-10-CM

## 2022-01-18 DIAGNOSIS — Z20.822: ICD-10-CM

## 2022-01-18 DIAGNOSIS — O34.211: ICD-10-CM

## 2022-01-18 DIAGNOSIS — F17.210: ICD-10-CM

## 2022-01-18 DIAGNOSIS — D68.51: ICD-10-CM

## 2022-01-18 DIAGNOSIS — Z98.890: ICD-10-CM

## 2022-01-18 DIAGNOSIS — Z3A.37: ICD-10-CM

## 2022-01-18 DIAGNOSIS — F12.90: ICD-10-CM

## 2022-01-18 DIAGNOSIS — O44.03: Primary | ICD-10-CM

## 2022-01-18 DIAGNOSIS — Z91.040: ICD-10-CM

## 2022-01-18 LAB
AMPHETAMINES UR SCN-MCNC: DETECTED NG/ML
AMPHETAMINES UR-MCNC: DETECTED UG/L
BASOPHILS # BLD AUTO: 0.07 K/MM3 (ref 0–0.23)
BASOPHILS NFR BLD AUTO: 1 % (ref 0–2)
CANNABINOIDS UR QL: DETECTED
DEPRECATED RDW RBC AUTO: 41.8 FL (ref 35.1–46.3)
EOSINOPHIL # BLD AUTO: 0.14 K/MM3 (ref 0–0.68)
EOSINOPHIL NFR BLD AUTO: 1 % (ref 0–6)
ERYTHROCYTE [DISTWIDTH] IN BLOOD BY AUTOMATED COUNT: 14.4 % (ref 11.7–14.2)
FLUAV RNA SPEC QL NAA+PROBE: NEGATIVE
FLUBV RNA SPEC QL NAA+PROBE: NEGATIVE
HCT VFR BLD AUTO: 35.9 % (ref 33–51)
HGB BLD-MCNC: 12.2 G/DL (ref 11.5–16)
IMM GRANULOCYTES # BLD AUTO: 0.15 K/MM3 (ref 0–0.1)
IMM GRANULOCYTES NFR BLD AUTO: 1 % (ref 0–1)
LYMPHOCYTES # BLD AUTO: 2.31 K/MM3 (ref 0.84–5.2)
LYMPHOCYTES NFR BLD AUTO: 16 % (ref 21–46)
MCHC RBC AUTO-ENTMCNC: 34 G/DL (ref 31.5–36.5)
MCV RBC AUTO: 82 FL (ref 80–100)
MONOCYTES # BLD AUTO: 0.68 K/MM3 (ref 0.16–1.47)
MONOCYTES NFR BLD AUTO: 5 % (ref 4–13)
NEUTROPHILS # BLD AUTO: 10.8 K/MM3 (ref 1.96–9.15)
NEUTROPHILS NFR BLD AUTO: 76 % (ref 41–73)
NRBC # BLD AUTO: 0 K/MM3 (ref 0–0.02)
NRBC BLD AUTO-RTO: 0 /100 WBC (ref 0–0.2)
PCO2 BLDCOA: 61.2 MMHG (ref 40–50)
PCO2 BLDCOV: 49.1 MMHG (ref 40–50)
PH BLDCOA: 7.29 [PH] (ref 7.28–7.35)
PH BLDCOV: 7.36 [PH] (ref 7.26–7.35)
PLATELET # BLD AUTO: 284 K/MM3 (ref 150–400)
PO2 BLDCOA: 14.5 MMHG (ref 16–20)
PO2 BLDCOV: 29.7 MMHG (ref 28–32)
RSV RNA SPEC QL NAA+PROBE: NEGATIVE
SARS-COV-2 RNA RESP QL NAA+PROBE: NEGATIVE

## 2022-01-18 PROCEDURE — A9270 NON-COVERED ITEM OR SERVICE: HCPCS

## 2022-01-18 NOTE — NUR
01/18/22 1256 Dorcas Gonzalez
VIABLE MALE BORN AT 1244 WITH NUCHAL AND MECONIUM FLUID. WEIGHT 6-7
(2928) APGARS 9/9. PLACENTA DELIVERED MANUALLY AND CORD GASES
COLLECTED AND GIVEN TO RT. CORD BLOOD GIVEN TO MELANI RN. CORD
SEGMONT CUT AND USED FOR CORD STAT 13 AND GIVEN TO MELANI RN.
METHERGINE GIVEN AT 1253 PER DR LIMA FOR PROPHYLAXIS IN LEFT
THIGH.

## 2022-01-19 LAB
BASOPHILS # BLD AUTO: 0.04 K/MM3 (ref 0–0.23)
BASOPHILS NFR BLD AUTO: 0 % (ref 0–2)
DEPRECATED RDW RBC AUTO: 43.2 FL (ref 35.1–46.3)
EOSINOPHIL # BLD AUTO: 0.12 K/MM3 (ref 0–0.68)
EOSINOPHIL NFR BLD AUTO: 1 % (ref 0–6)
ERYTHROCYTE [DISTWIDTH] IN BLOOD BY AUTOMATED COUNT: 14.5 % (ref 11.7–14.2)
HCT VFR BLD AUTO: 32.8 % (ref 33–51)
HGB BLD-MCNC: 10.8 G/DL (ref 11.5–16)
IMM GRANULOCYTES # BLD AUTO: 0.09 K/MM3 (ref 0–0.1)
IMM GRANULOCYTES NFR BLD AUTO: 1 % (ref 0–1)
LYMPHOCYTES # BLD AUTO: 2.04 K/MM3 (ref 0.84–5.2)
LYMPHOCYTES NFR BLD AUTO: 17 % (ref 21–46)
MCHC RBC AUTO-ENTMCNC: 32.9 G/DL (ref 31.5–36.5)
MCV RBC AUTO: 84 FL (ref 80–100)
MONOCYTES # BLD AUTO: 0.85 K/MM3 (ref 0.16–1.47)
MONOCYTES NFR BLD AUTO: 7 % (ref 4–13)
NEUTROPHILS # BLD AUTO: 9.05 K/MM3 (ref 1.96–9.15)
NEUTROPHILS NFR BLD AUTO: 74 % (ref 41–73)
NRBC # BLD AUTO: 0 K/MM3 (ref 0–0.02)
NRBC BLD AUTO-RTO: 0 /100 WBC (ref 0–0.2)
PLATELET # BLD AUTO: 193 K/MM3 (ref 150–400)

## 2022-01-19 NOTE — NUR
PT USED THE RESTROOM AND REPORTS 9/10 PAIN. SCHEDULED TORADOL WAS GIVEN.
PT DECLINES PERCOCET AT THIS TIME.

## 2025-07-16 NOTE — NUR
BREAKFAST VINNY WELL BY PT. PT DESIRES TO EAT FOOD THIS MORNING Called patient to schedule 3 month f/u appt on 10/30 at 9:30am. Patient verbalized agreement